# Patient Record
Sex: FEMALE | Race: WHITE
[De-identification: names, ages, dates, MRNs, and addresses within clinical notes are randomized per-mention and may not be internally consistent; named-entity substitution may affect disease eponyms.]

---

## 2020-01-19 ENCOUNTER — HOSPITAL ENCOUNTER (INPATIENT)
Dept: HOSPITAL 95 - ER | Age: 20
LOS: 2 days | Discharge: HOME | DRG: 195 | End: 2020-01-21
Attending: INTERNAL MEDICINE | Admitting: HOSPITALIST
Payer: COMMERCIAL

## 2020-01-19 VITALS — BODY MASS INDEX: 15.76 KG/M2 | HEIGHT: 65 IN | WEIGHT: 94.58 LBS

## 2020-01-19 DIAGNOSIS — Z93.4: ICD-10-CM

## 2020-01-19 DIAGNOSIS — K59.00: ICD-10-CM

## 2020-01-19 DIAGNOSIS — E86.0: ICD-10-CM

## 2020-01-19 DIAGNOSIS — G40.909: ICD-10-CM

## 2020-01-19 DIAGNOSIS — M41.9: ICD-10-CM

## 2020-01-19 DIAGNOSIS — J10.08: Primary | ICD-10-CM

## 2020-01-19 DIAGNOSIS — G80.9: ICD-10-CM

## 2020-01-19 DIAGNOSIS — K52.9: ICD-10-CM

## 2020-01-19 LAB
ALBUMIN SERPL BCP-MCNC: 3.5 G/DL (ref 3.4–5)
ALBUMIN/GLOB SERPL: 0.9 {RATIO} (ref 0.8–1.8)
ALT SERPL W P-5'-P-CCNC: 29 U/L (ref 12–78)
ANION GAP SERPL CALCULATED.4IONS-SCNC: 8 MMOL/L (ref 6–16)
AST SERPL W P-5'-P-CCNC: 32 U/L (ref 12–37)
BASOPHILS # BLD AUTO: 0.01 K/MM3 (ref 0–0.23)
BASOPHILS NFR BLD AUTO: 0 % (ref 0–2)
BILIRUB SERPL-MCNC: 0.2 MG/DL (ref 0.1–1)
BUN SERPL-MCNC: 7 MG/DL (ref 8–21)
CALCIUM SERPL-MCNC: 9 MG/DL (ref 8.5–10.1)
CHLORIDE SERPL-SCNC: 105 MMOL/L (ref 98–108)
CO2 SERPL-SCNC: 29 MMOL/L (ref 21–32)
CREAT SERPL-MCNC: 0.48 MG/DL (ref 0.4–1)
DEPRECATED RDW RBC AUTO: 42.7 FL (ref 35.1–46.3)
EOSINOPHIL # BLD AUTO: 0.01 K/MM3 (ref 0–0.68)
EOSINOPHIL NFR BLD AUTO: 0 % (ref 0–6)
ERYTHROCYTE [DISTWIDTH] IN BLOOD BY AUTOMATED COUNT: 12.2 % (ref 11.7–14.2)
GLOBULIN SER CALC-MCNC: 4 G/DL (ref 2.2–4)
GLUCOSE SERPL-MCNC: 94 MG/DL (ref 70–99)
HCT VFR BLD AUTO: 46.3 % (ref 33–51)
HGB BLD-MCNC: 14.8 G/DL (ref 11.5–16)
IMM GRANULOCYTES # BLD AUTO: 0.01 K/MM3 (ref 0–0.1)
IMM GRANULOCYTES NFR BLD AUTO: 0 % (ref 0–1)
LYMPHOCYTES # BLD AUTO: 1.51 K/MM3 (ref 0.84–5.2)
LYMPHOCYTES NFR BLD AUTO: 34 % (ref 21–46)
MAGNESIUM SERPL-MCNC: 2.1 MG/DL (ref 1.6–2.4)
MCHC RBC AUTO-ENTMCNC: 32 G/DL (ref 31.5–36.5)
MCV RBC AUTO: 94 FL (ref 80–100)
MONOCYTES # BLD AUTO: 0.41 K/MM3 (ref 0.16–1.47)
MONOCYTES NFR BLD AUTO: 9 % (ref 4–13)
NEUTROPHILS # BLD AUTO: 2.53 K/MM3 (ref 1.96–9.15)
NEUTROPHILS NFR BLD AUTO: 57 % (ref 41–73)
NRBC # BLD AUTO: 0 K/MM3 (ref 0–0.02)
NRBC BLD AUTO-RTO: 0 /100 WBC (ref 0–0.2)
PLATELET # BLD AUTO: 161 K/MM3 (ref 150–400)
POTASSIUM SERPL-SCNC: 3.7 MMOL/L (ref 3.5–5.5)
PROT SERPL-MCNC: 7.5 G/DL (ref 6.4–8.2)
SODIUM SERPL-SCNC: 142 MMOL/L (ref 136–145)

## 2020-01-19 PROCEDURE — G0378 HOSPITAL OBSERVATION PER HR: HCPCS

## 2020-01-19 NOTE — NUR
Shift Summary
A/Ox1 to self and family. Mom and grandma has been at bedside throughout the
day. Tube feeding initiated and has been increased to 30mLs/hr on continuous.
No c/o pain. Requires total assist for all needs. Pt continues to have
intermittent nonproductive coughing episodes. No other acute changes.

## 2020-01-19 NOTE — NUR
MIRALAX
Dianne (mom) stated patient takes miralax every other day at night with night
time meds and would like this ordered to prevent constipation for which pt had
experienced in the past. Dr. Salgado notified, order received.

## 2020-01-19 NOTE — NUR
CHANGE OF SHIFT ADMISSION
 
PT SETTLED IN ROOM FROM ER. CHANGE OF SHIFT ADMIT. 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
PALSY. SHE MOANS AND FEARFUL WITH PROCEDURES. TOOK SEVERAL MINUTES TO DRAW
BLOOD FOR ROUTINE LABS. IVF FLUIDS AND IV ABX STARTED. INITAL SCREENING AND
MEDICATION REC COMPLETED. PT MOTHER AT BEDSIDE. PT ATTENDS ARE WET UPON
ADMISSION. PT MOTHER DECLINES FOR US TO CHANGE PT AT THIS TIME. MOTHER STATED
THAT SHE WOULD LIKE TO WAIT UNTIL 0800, BECAUSE THATS WHEN THEY DO IT AT HOME.
PT MOTHER ALSO DECLIMES LOVENOX INJECTION AT THIS TIME AND WOULD LIKE TO TRY
LATER. REPORT GIVEN TO GEORGI MORALES. ADMISSION ASSESSMENT, AND HISTORY STILL
NEEDS TO BE COMPLETED AT THIS TIME. GEORGI MORALES MADE AWARE.

## 2020-01-19 NOTE — NUR
Dietician consult
This RN consulted Kelvin per orders from Dr. Salgado RE intiating tube feedings
today and slowly integrating it back into pt's routine. Kelvin did come up and
spoke with Dianne (mom). Formula will have to be ordered per Kelvin, but mom
will be bringing in formula for the time being so feedings can resume.

## 2020-01-20 LAB
ANION GAP SERPL CALCULATED.4IONS-SCNC: 7 MMOL/L (ref 6–16)
BUN SERPL-MCNC: 5 MG/DL (ref 8–21)
CALCIUM SERPL-MCNC: 8.2 MG/DL (ref 8.5–10.1)
CHLORIDE SERPL-SCNC: 113 MMOL/L (ref 98–108)
CO2 SERPL-SCNC: 24 MMOL/L (ref 21–32)
CREAT SERPL-MCNC: 0.4 MG/DL (ref 0.4–1)
GLUCOSE SERPL-MCNC: 85 MG/DL (ref 70–99)
MAGNESIUM SERPL-MCNC: 2.1 MG/DL (ref 1.6–2.4)
PHOSPHATE SERPL-MCNC: 3.4 MG/DL (ref 2.5–4.9)
POTASSIUM SERPL-SCNC: 3.7 MMOL/L (ref 3.5–5.5)
SODIUM SERPL-SCNC: 144 MMOL/L (ref 136–145)

## 2020-01-20 NOTE — NUR
AT 2035 HEARD MOTHER YELLING OUT FOR HELP. ARRIVED TO ROOM TO FIND MOTHER
HOLDING THE PATIENT FORWARD. PATIENT WAS HAVING A SEIZURE WITH HER EYES ROLLED
BACK IN HER HEAD, TONGUE OUT AND PURPLE FROM JAW CLANCHING, POSTURING, WITH
GASPING OF AIR. HELPED MOTHER TO CONTROL THE SEIZURE UNTIL IT WAS OVER.
PATIENT DID VOMIT DURING THEREFORE SUCTION WAS DONE, POSSIBLE ASPIRATION
NOTED. SEIZURE LASTED 5 OR MORE MINUTES. MOTHER VERY CONCERNED AND UPSET. SHE
WANTED TO KNOW WHAT HER DAUGHTER HAD DIFFERENT TO CAUSE A SEIZURE LIKE THAT.
SHE STATES SHE HAS SMALL ABSENT SEIZURES EVERY DAY BUT NOTHING LIKE THAT IN A
LONG TIME. REVIEWED MEDS WITH MOTHER, SHE ASKED TO HAVE DAUGHTER OFF LOVENOX
AND TO HOLD LEVAQUIN, AND POSSIBLY THE DUONEB AS THEY WERE ALL NEW. STOPPED
TUBE FEEDING AS SHE WAS STILL COUGHING. MOTHER ASKED TO LEAVE IT OFF THE REST
OF THE NIGHT. GAVE HER NIGHT BEDS, THAT INCLUDED HER ANTICONVULSANT MEDS. RT
SHOWED UP AT THIS TIME, DISCUSSED HOLDING OF TREATMENTS PER MOTHER REQUEST.
MOTHER WAS RESEARCHING THE SIDE EFFECTS AND INTERACTIONS OF MEDS WITH HER
DAUGHTERS EPILEPSY.
 
2120 LEFT MOTHER AND CALLED HOPSITALIST. SPOKE TO DR. HARRINGTON AND INFORMED OF
SITUATION. ORDER FOR SEIZURE PERCAUTIONS ORDERED AND DC OF LOVENOX AS MOTHER
DID REQUST SINCE HER DAUGHTER HAS HAD BRAIN HEMMORRIAGE BEFORE. WE ARE TO
MONITOR HER THROUGH THE NIGHT AND CALL IF THERE IS ANY MORE ISSUES.
 
2130 INFORMED MOTHER OF PHONE CALL TO HOSPITALIST. SEIZURE PADS PLACED ON BED.
MOTHER ASKED FOR LIST OF MEDICATIONS, THIS WAS GIVEN TO HER. MOTHER STILL
PRETTY UPSET WITH SITUATION AND WORRIED ABOUT HER DAUGHTER. WANTS HER TO GET
REST FOR THE NIGHT. PATIENT WAS ALERT MAKING NOISES AND DID COME BACK TO HER
NORMAL BUT WAS TIRED.

## 2020-01-20 NOTE — NUR
BRANDEN ALERT AND MAKING SOUNDS. MOTHER AT BEDSIDE. TUBE FEEDING INFUSING AT
50ML/HR. IV SL. RAISED HEAD OF BED UP AS THE MOTHER HAD HER LAYING DOWN AROUND
30 DEGREES. ATTENDS JUST CHANGED PER DAY SHIFT. LUNG SOUNDS ARE COURSE ON THE
RIGHT, AND DIMINISHED ON THE LEFT. COUGH IS NON-PRODUCTIVE. AFEBRILE. VITALS
WNL. MOM HAS CALL LIGHT AND WILL CALL IF THERE IS ANY ISSUES.

## 2020-01-20 NOTE — NUR
SHIFT SUMMARY.
PT IS ALERT, APPEARS TO RECOGNIZE FAMILY, NON VERBAL, NO S/SX OF DISCOMFORT OR
DISTRESS. PT TOLERATED TF WELL WITH NO RESIDUAL, PT IS AT GOAL RATE SINCE THIS
AFTERNOON. EITHER MOTHER OR GRANDPARENTS AT BEDSIDE THROUGHOUT THE SHIFT. PT
ON RA, LUNGS COARSE IN R BASE OTHERWISE CLEAR. NO VOMITTING. INCONTINENCE CARE
PERFORMED ROUTINELY. NO NEW CHANGES OR CONCERNS.

## 2020-01-20 NOTE — NUR
SHIFT SUMMARY
PT WITH CEREBRAL PALSY. MOSTLY NON VERBAL. BABBLES SINGLE WORDS OVER AND OVER
AT TIMES SUCH AS "HEY" OR "HI". PT DOES GET FEARFUL AND TEARFUL AT TIMES WITH
CARE. MOTHER AT BEDSIDE TENDING TO PT AND PROVIDING MUCH OF THE CARE PT
REQUIRES. PT INCONTINENT. PEG TUBE WITH CONTINUOUS FEEDS. NO ORDER FOR HOW
FREQUENTLY TO INCREASE FEEDING, JUST THAT DOCTOR WANTED FEEDINGS ADVANCED
SLOWLY. WILL INCREASE FEED 10 ML/HR TO 40 ML/HR THIS AM.  GOAL RATE BEING 50
ML/HR. PT SEEMS TO BE TOLERATING WELL. NO VOMITING OR ABD DISTENSION. MOTHER
DOES LOWER HEAD OF BED TO BELOW 30 DEGREES, EDUCATED ON RISK OF ASPIRATION
WITH CONTINUOUS FEEDS. MOTHER STATED PT CANNOT SLEEP SITTING UP. PT HAS
OCCASSIONAL WET SOUNDING COUGH. IS UNABLE TO CLEAR THROAT. MOTHER REQUESTED NO
SUCTIONING, STATING THAT IT ONLY MAKES THE PT THROW UP. MOTHER DOES STATE THAT
COUGH IS IMPROVING. PT SLIGHTLY FEBRILE THIS EVENING WITH 100.7 DEG TEMP.
MOTRIN PER TUBE GIVEN WITH GOOD RESULTS. PT RESTING IN BED AT THIS TIME.
MOTHER REMAINED AT BEDSIDE THROUGHOUT THE NIGHT. WILL CONTINUE TO MONITOR AND
REPORT TO DAY RN.

## 2020-01-21 LAB
ANION GAP SERPL CALCULATED.4IONS-SCNC: 6 MMOL/L (ref 6–16)
BUN SERPL-MCNC: 5 MG/DL (ref 8–21)
CALCIUM SERPL-MCNC: 8.5 MG/DL (ref 8.5–10.1)
CHLORIDE SERPL-SCNC: 113 MMOL/L (ref 98–108)
CO2 SERPL-SCNC: 24 MMOL/L (ref 21–32)
CREAT SERPL-MCNC: 0.45 MG/DL (ref 0.4–1)
GLUCOSE SERPL-MCNC: 80 MG/DL (ref 70–99)
MAGNESIUM SERPL-MCNC: 2.1 MG/DL (ref 1.6–2.4)
PHOSPHATE SERPL-MCNC: 3.8 MG/DL (ref 2.5–4.9)
POTASSIUM SERPL-SCNC: 3.7 MMOL/L (ref 3.5–5.5)
SODIUM SERPL-SCNC: 143 MMOL/L (ref 136–145)

## 2020-01-21 NOTE — NUR
2230 MOTHER CALLED ME IN REPORTING SHE IS NOTICING THAT HER DAUGHTER IS HAVING
SEVERAL SMALL SEIZURES WITH ARMS POSTUREING OUT, HEAD TO SIDE AND EYES GLAZED
TO THE RIGHT. SHE STATE THESE ARE HER NORMAL ONES. WITNESSED THE PATIENT HAVE
ONE WHILE I WAS IN THE ROOM. THEY LAST FOR FEW SECONDS AT A TIME. SHE STATES
SHE NORMALY DONT HAVE THEM BACK TO BACK LIKE THIS.
 
2340 MOTHER CALLED AGAIN ASKING IF WE CAN GET HER DAUGHTER SOMETHING AS SHE
HAS HAD 10-15 SMALL SEIZURES SINCE THE BIG ONE EARLIER. STATES HER DAUGHTER IS
NOT GETTING ANY SLEEP. SHE NORMALLY WILL HAVE 3-5 SMALL ABSENT SEIZURES A DAY
BUT THIS IS MORE THEN SHE EVER HAS HAD.
 
0015 FINALLY GOT JOHANNA OF DR. THOMASON, INFORMED OF MULTIPLE SEIZURES THAT
THE PATIENT HAS HAD SO FAR TONIGHT, THE MOTHERS CONCERN, AND REQUESTED THAT
THE PATIENT HAVE SOMETHING TO SLOW THEM DOWN. HE ORDERED KEPPRA IV 500MG X1
NOW. ALSO INFORMED OF HOLD ON TUBE FEEDING PER MOTHER REQUEST.
 
0020 INFORMED MOTHER OF THE ORDER AND ASKED IF SHE HAS HAD KEPPRA BEFORE. SHE
HAS HAD IT BUT STOPPED IT DUE TO LONG TERM EFFECTS. OK TO GIVE 1 TIME DOSE.
ORDER PLACED.
 
0041 IV FLUSHED AND KEPPRA HUNG. MOTHER ALREADY CHANGED THE PATIENT ABOUT 40
MINUTES AGO. PATIENT MAKING SOUNDS, SMILING AND ALERT.

## 2020-01-21 NOTE — NUR
SHIFT SUMMARY: BRANDEN HAD A SIGNFICANT SEIZURE AT START OF SHIFT THAT LASTED
5 MIN OR SO. MOTHER REPORTS SHE HAS NOT HAD A SEIZURE LIKE THAT IN A LONG
TIME. MOTHER REPORTS SHE MIGHT HAVE 1 OR TWO BIG SEIZURES A YEAR BUT IT HAS
BEEN A LONG TIME SINCE SHE HAS HAD ONE, STATE IT ONLY OCCURS WHEN THINGS
CHANGE IN HER REGIMEN. CONCERNED ABOUT HER DAUGHTERS MEDICATION REVIEWED EACH
OF THE MEDS THAT WERE GIVEN. MOTHER REQUEST NO FURTHER LOVENOX AS HER DAUGHTER
HAS HAD A PREVIOUS BRAIN HEMORRIAGE AND SHOULD NOT BE ON BLOOD THINNERS. SHE
ALSO WANTS TO HOLD THE NEBULIZER TREATMENTS, LEVAQUIN AND HER TUBE FEEDING
TILL SHE TALKS WITH THE DOCTOR. MD WAS NOTIFIED AND ORDERS FOR SEIZURE
PRECAUTIONS AND TO DC LOVENOX WAS RECIEVED. CONTINUE MONITORING. BRANDEN ALSO
VOMITED DURING HER SEIZURE NEEDING SUCTION BUT MIGHT HAD ASPIRATED SOME. TUBE
FEEDING WAS STOPPED FOR THE NIGHT. BRANDEN CONTINUED TO HAVE MULTIPLE BACK TO
BACK SMALL ABSENT SEIZURES AFTER WARDS PREVENTING HER FROM SLEEPING. MOTHER
REQUEST SOMETHING TO HELP STOP THEM. MOTHER STATES SHE HAS A COUPLE A DAY BUT
NOTHING OF THIS AMOUNT. MD WAS CALLED AND NOTIFED AGAIN, ORDER FOR IV KEPPRA
500MG ORDERED AND GIVEN. THIS HELPED TO STOP THE RECURRENT SEIZURES. SHE WAS
ABLE TO SLEEP THE REST OF THE NIGHT. MOTHER SLEPT IN THE CHAIR. SHE WAS
CHECKED ON FREQUENTLY TO MAKE SURE NO FURTHER SEIZURES OCCURRED. VS REMAINED
STABLE DURING THIS. SHE DID HAVE CHANGES ON HER TELEMETRY WHILE SHE WAS
SEIZING, OTHER THEN THAT SHE REMAINED SINUS. LEVAQUIN NOT GIVEN THIS AM DUE TO
MOTHERS REQUEST. WILL INFORM DAY SHIFT OF NEED TO RESTART TUBE FEEDING WHEN
MOTHER FEELS COMFORTABLE TO RESUME. SEIZURE PADS WERE PLACED ON BED LAST NIGHT
AS WELL. WILL REPORT TO DAY SHIFT OF CHANGES.

## 2020-01-21 NOTE — NUR
SHIFT SUMMARY.
0945 TF RESTARTED AT GOAL RATE, PT TOLERATED WELL.
1500 SOAP JUAN J ENEMA ADMISTERED, TF STOPPED, PT LAYING ON L SIDE.
1630 PT HAD EXTRA LARGE BM.
1748 PT DISCHARGED HOME VIA PERSONAL W/C VEHICLE, MOM AND GRANDMA PRESENT. IV
REMOVED. D/C PAPERWORK REVIEWED WITH MOM AND COPY PROVIDED, HARD SCRIPT FOR
DIAZEPAM KIT GIVEN. NEW RX FAXED TO [facility] PER REQUEST. NO SEIZURE
ACTIVITY THIS SHIFT. NO OTHER CHANGES.

## 2020-05-03 ENCOUNTER — HOSPITAL ENCOUNTER (EMERGENCY)
Dept: HOSPITAL 95 - ER | Age: 20
Discharge: HOME | End: 2020-05-03
Payer: COMMERCIAL

## 2020-05-03 VITALS — BODY MASS INDEX: 16.73 KG/M2 | HEIGHT: 64 IN | WEIGHT: 98 LBS

## 2020-05-03 DIAGNOSIS — Z79.899: ICD-10-CM

## 2020-05-03 DIAGNOSIS — Z76.0: ICD-10-CM

## 2020-05-03 DIAGNOSIS — G40.909: Primary | ICD-10-CM

## 2020-11-02 ENCOUNTER — HOSPITAL ENCOUNTER (EMERGENCY)
Dept: HOSPITAL 95 - ER | Age: 20
Discharge: HOME | End: 2020-11-02
Payer: COMMERCIAL

## 2020-11-02 VITALS — HEIGHT: 67 IN | WEIGHT: 86.99 LBS | BODY MASS INDEX: 13.65 KG/M2

## 2020-11-02 DIAGNOSIS — Z76.0: Primary | ICD-10-CM

## 2020-11-02 DIAGNOSIS — Z79.899: ICD-10-CM

## 2020-11-02 DIAGNOSIS — Z98.2: ICD-10-CM

## 2020-11-02 DIAGNOSIS — G40.909: ICD-10-CM

## 2020-11-02 DIAGNOSIS — Z77.21: ICD-10-CM

## 2021-02-09 ENCOUNTER — HOSPITAL ENCOUNTER (OUTPATIENT)
Dept: HOSPITAL 95 - PLD | Age: 21
Discharge: HOME | End: 2021-02-09
Attending: STUDENT IN AN ORGANIZED HEALTH CARE EDUCATION/TRAINING PROGRAM
Payer: COMMERCIAL

## 2021-02-09 DIAGNOSIS — Z11.3: Primary | ICD-10-CM

## 2021-07-07 ENCOUNTER — HOSPITAL ENCOUNTER (EMERGENCY)
Dept: HOSPITAL 95 - ER | Age: 21
LOS: 1 days | Discharge: HOME | End: 2021-07-08
Payer: COMMERCIAL

## 2021-07-07 VITALS — WEIGHT: 100 LBS | HEIGHT: 60 IN | BODY MASS INDEX: 19.63 KG/M2

## 2021-07-07 DIAGNOSIS — Z76.0: Primary | ICD-10-CM

## 2021-07-07 PROCEDURE — A9270 NON-COVERED ITEM OR SERVICE: HCPCS

## 2021-07-26 ENCOUNTER — HOSPITAL ENCOUNTER (OUTPATIENT)
Dept: HOSPITAL 95 - LAB SHORT | Age: 21
End: 2021-07-26
Attending: PHYSICIAN ASSISTANT
Payer: COMMERCIAL

## 2021-07-26 DIAGNOSIS — L08.0: Primary | ICD-10-CM

## 2023-01-10 ENCOUNTER — HOSPITAL ENCOUNTER (EMERGENCY)
Dept: HOSPITAL 95 - ER | Age: 23
LOS: 1 days | Discharge: HOME | End: 2023-01-11
Payer: COMMERCIAL

## 2023-01-10 VITALS — HEIGHT: 63 IN | BODY MASS INDEX: 18.61 KG/M2 | WEIGHT: 105.01 LBS

## 2023-01-10 DIAGNOSIS — Z79.899: ICD-10-CM

## 2023-01-10 DIAGNOSIS — J69.0: Primary | ICD-10-CM
